# Patient Record
Sex: MALE | Race: BLACK OR AFRICAN AMERICAN | NOT HISPANIC OR LATINO | Employment: STUDENT | URBAN - METROPOLITAN AREA
[De-identification: names, ages, dates, MRNs, and addresses within clinical notes are randomized per-mention and may not be internally consistent; named-entity substitution may affect disease eponyms.]

---
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
## 2024-01-24 ENCOUNTER — TELEPHONE (OUTPATIENT)
Dept: INTERNAL MEDICINE | Facility: CLINIC | Age: 19
End: 2024-01-24
Payer: MEDICAID

## 2024-01-24 DIAGNOSIS — L73.9 FOLLICULITIS: Primary | ICD-10-CM

## 2024-01-24 NOTE — TELEPHONE ENCOUNTER
I am not sure of anywhere he can do those labs that is set up like that.  Can he do a video for the referral?

## 2024-01-24 NOTE — TELEPHONE ENCOUNTER
Ok to place referral.   I wrote him out a lab order when he was here last.   I would say [facility]

## 2024-01-24 NOTE — TELEPHONE ENCOUNTER
PATIENT'S FATHER CALLED TO GET A REFERRAL FOR DERMATOLOGY, HE HAS ACNE ALL OVER HIS BODY. THIS HAS BEEN DISCUSSED AT LAST VISIT. FATHER WANTED TO ASK IF THE REFERRAL COULD BE DONE WITHOUT A VISIT.      ALSO FATHER STATES HE WAS UNABLE TO GET A BLOOD DRAWL DUE TO PATIENT BEING AFRAID AND DOES NOT LIKE TO BE CONFINED. HE IS WANTING TO KNOW IF THERE IS A PLACE FOR HIM TO GET A LAB APPOINTMENT WHO CAN DO THEM FAST AND MAYBE A PLACE THAT HAS HANDLED THIS KIND OF FEAR AND CAN BE DISTRACTED WHILE HAVING LABS DONE    PLEASE CALL [phone]

## 2024-06-13 ENCOUNTER — TELEPHONE (OUTPATIENT)
Dept: INTERNAL MEDICINE | Facility: CLINIC | Age: 19
End: 2024-06-13

## 2024-06-13 NOTE — TELEPHONE ENCOUNTER
Hub staff attempted to follow warm transfer process and was unsuccessful     Caller: ELO MCKINNEY    Relationship to patient: Father    Best call back number: [phone]     Patient is needing: PATIENT IS REQUESTING A SAME DAY APPOINTMENT FOR COUGH. PLEASE CALL AND ADVISE.

## 2024-09-20 ENCOUNTER — OFFICE VISIT (OUTPATIENT)
Dept: INTERNAL MEDICINE | Facility: CLINIC | Age: 19
End: 2024-09-20
Payer: MEDICARE

## 2024-09-20 VITALS
HEART RATE: 72 BPM | BODY MASS INDEX: 30.18 KG/M2 | TEMPERATURE: 96.5 F | RESPIRATION RATE: 18 BRPM | HEIGHT: 62 IN | WEIGHT: 164 LBS | OXYGEN SATURATION: 97 %

## 2024-09-20 DIAGNOSIS — Z13.1 SCREENING FOR DIABETES MELLITUS: ICD-10-CM

## 2024-09-20 DIAGNOSIS — Z00.00 ANNUAL PHYSICAL EXAM: ICD-10-CM

## 2024-09-20 DIAGNOSIS — Q90.9 DOWN SYNDROME: Primary | ICD-10-CM

## 2024-09-20 DIAGNOSIS — Z00.00 MEDICARE ANNUAL WELLNESS VISIT, SUBSEQUENT: ICD-10-CM

## 2024-09-20 DIAGNOSIS — Z13.220 SCREENING CHOLESTEROL LEVEL: ICD-10-CM

## 2024-09-20 DIAGNOSIS — F84.0 AUTISM: ICD-10-CM

## 2024-10-07 ENCOUNTER — TELEPHONE (OUTPATIENT)
Dept: INTERNAL MEDICINE | Facility: CLINIC | Age: 19
End: 2024-10-07

## 2024-10-07 NOTE — TELEPHONE ENCOUNTER
Caller: JAMMIE MCKINNEYKIRBY    Relationship: Father    Best call back number: [phone]     Caller requesting test results:      What test was performed: LABS    When was the test performed: 10/04/24    Where was the test performed: OFFICE    Additional notes: PATIENT FATHER HANK CALLED WANTING THE TEST RESULTS FROM THE LABS DONE ON 10/04/24.  PLEASE CALL HIM BACK.

## 2025-05-21 ENCOUNTER — TELEPHONE (OUTPATIENT)
Dept: INTERNAL MEDICINE | Facility: CLINIC | Age: 20
End: 2025-05-21
Payer: MEDICAID

## 2025-05-22 ENCOUNTER — TELEMEDICINE (OUTPATIENT)
Dept: INTERNAL MEDICINE | Facility: CLINIC | Age: 20
End: 2025-05-22
Payer: MEDICAID

## 2025-05-22 DIAGNOSIS — F84.0 AUTISM: ICD-10-CM

## 2025-05-22 DIAGNOSIS — Q90.9 DOWN SYNDROME: Primary | ICD-10-CM

## 2025-05-22 PROCEDURE — 1159F MED LIST DOCD IN RCRD: CPT | Performed by: NURSE PRACTITIONER

## 2025-05-22 PROCEDURE — 1160F RVW MEDS BY RX/DR IN RCRD: CPT | Performed by: NURSE PRACTITIONER

## 2025-05-22 PROCEDURE — 99212 OFFICE O/P EST SF 10 MIN: CPT | Performed by: NURSE PRACTITIONER

## 2025-05-22 NOTE — ASSESSMENT & PLAN NOTE
Psychological condition is stable.  Stable  Psychological condition  will be reassessed at the next regular appointment.

## 2025-05-22 NOTE — PROGRESS NOTES
"Chief Complaint    Form completed for school     Subjective        Sonido Schaefer presents to [facility]  History of Present Illness  History of Present Illness     You have chosen to receive care through a telephone visit. Do you consent to use a telephone visit for your medical care today? Yes    Patient and his father are currently in [facility] and I am currently in [facility] doing a telehealth conference today.    Patient needs a form filled out for his school at this time.  Dad is patient's legal guardian as patient has a history of autism and Down syndrome.  No other problems on today's office visit.      Objective   Vital Signs:  There were no vitals taken for this visit.  Estimated body mass index is 29.99 kg/m² as calculated from the following:    Height as of 9/20/24: 157.5 cm (62.01\").    Weight as of 9/20/24: 74.4 kg (164 lb).       Physical Exam  Constitutional:       Appearance: Normal appearance.   Neurological:      Mental Status: He is alert.   Psychiatric:         Behavior: Behavior normal.        Physical Exam       Result Review :          Results                Assessment and Plan        Down syndrome         Autism  Psychological condition is stable.  Stable  Psychological condition  will be reassessed at the next regular appointment.            Assessment & Plan    Patient will continue in school setting and help will be provided per staff.  Patient will come in soon for his annual physical exam/checkup.  Per father patient is doing very well and eating well at this time and is very active.  No complaints.       I spent 30 minutes caring for Sonido on this date of service. This time includes time spent by me in the following activities:preparing for the visit, reviewing tests, obtaining and/or reviewing a separately obtained history, performing a medically appropriate examination and/or evaluation , counseling and educating the " patient/family/caregiver, ordering medications, tests, or procedures, referring and communicating with other health care professionals , documenting information in the medical record, independently interpreting results and communicating that information with the patient/family/caregiver, and care coordination  Follow Up     Return for Next scheduled follow up.  Patient was given instructions and counseling regarding his condition or for health maintenance advice. Please see specific information pulled into the AVS if appropriate.     Patient or patient representative verbalized consent for the use of Ambient Listening during the visit with  YULIA Medina for chart documentation. 5/22/2025  12:42 EDT